# Patient Record
Sex: MALE | Race: WHITE
[De-identification: names, ages, dates, MRNs, and addresses within clinical notes are randomized per-mention and may not be internally consistent; named-entity substitution may affect disease eponyms.]

---

## 2018-01-10 ENCOUNTER — HOSPITAL ENCOUNTER (OUTPATIENT)
Dept: HOSPITAL 62 - RAD | Age: 68
End: 2018-01-10
Attending: SURGERY
Payer: MEDICARE

## 2018-01-10 DIAGNOSIS — D12.0: Primary | ICD-10-CM

## 2018-01-10 PROCEDURE — 82565 ASSAY OF CREATININE: CPT

## 2018-01-10 PROCEDURE — 74177 CT ABD & PELVIS W/CONTRAST: CPT

## 2018-01-10 NOTE — RADIOLOGY REPORT (SQ)
EXAM DESCRIPTION:  CT ABD/PELVIS WITH IV   ORAL



COMPLETED DATE/TIME:  1/10/2018 8:18 am



REASON FOR STUDY:  BENIGN NEOPLASM OF CECUM (D12.0) D12.0  BENIGN NEOPLASM OF CECUM



COMPARISON:  None.



TECHNIQUE:  CT scan of the abdomen and pelvis performed using helical scanning technique with dynamic
 intravenous contrast injection.  Patient drank oral contrast. Images reviewed with lung, soft tissue
, and bone windows. Reconstructed coronal and sagittal MPR images reviewed. Delayed images for evalua
tion of the urinary system also acquired. All images stored on PACS.

All CT scanners at this facility use dose modulation, iterative reconstruction, and/or weight based d
osing when appropriate to reduce radiation dose to as low as reasonably achievable (ALARA).

[facility]: Dose Right  [facility]: CareDose    [facility]: Dose Right    [facility]: Teradose 4D    [facility]: Smart Technologies



CONTRAST TYPE AND DOSE:  contrast/concentration: Isovue 370.00 mg/ml; Total Contrast Delivered: 93.0 
ml; Total Saline Delivered: 71.0 ml



RENAL FUNCTION:  Creatinine 0.9



RADIATION DOSE:  CT Rad equipment meets quality standard of care and radiation dose reduction techniq
ues were employed. CTDIvol: 10.9 - 12.6 mGy. DLP: 1278 mGy-cm..



LIMITATIONS:  None.



FINDINGS:  Patient had a recent colonoscopy which demonstrated a mass in the cecum, tubulovillous at 
adenoma with atypia.  On axial images 56 through 66, and coronal image 55, there is a 4.8 cm cranioca
udad by 5.2 cm transverse x 2.6 cm AP mass along the posterior wall of the cecum extending from the i
leocecal valve to the base of the appendix.  There are several less than 7 mm right colon mesenteric 
nodes.

Remainder of the gastrointestinal tract is otherwise unremarkable.  Patient drank oral contrast.  No 
evidence of bowel obstruction.  No free air or fluid.

LOWER CHEST: Less than 4 mm left lateral costophrenic sulcus noncalcified granuloma axial image 14

LIVER: Normal size.  No masses.  No dilated ducts.  9 mm cyst sub- diaphragmatic surface left lobe li
davie axial image 15

SPLEEN: Normal size. No focal lesions.

PANCREAS: No masses. No significant calcifications. No adjacent inflammation or peripancreatic fluid 
collections. Pancreatic duct not dilated.

GALLBLADDER: No identified stones by CT criteria. No inflammatory changes to suggest cholecystitis.

ADRENAL GLANDS: No significant masses or asymmetry.

RIGHT KIDNEY AND URETER: No solid masses.   No significant calcifications.   No hydronephrosis or hyd
roureter.

LEFT KIDNEY AND URETER: No solid masses.  11 cm left renal cortical cyst.  No significant calcificati
ons.   No hydronephrosis or hydroureter.

AORTA AND VESSELS: No aneurysm. No dissection. Renal arteries, SMA, celiac without stenosis.

RETROPERITONEUM: No retroperitoneal adenopathy, hemorrhage or masses.

BOWEL AND PERITONEAL CAVITY: As above

APPENDIX: Normal.

PELVIS: No mass.  No free fluid. Normal bladder.

ABDOMINAL WALL: No masses. No hernias.

BONES: No significant or acute findings.

OTHER: No other significant finding.



IMPRESSION:  5.2 x 4.8 x 2.6 cm mass in the cecum, with multiple small right colon mesenteric nodes



TECHNICAL DOCUMENTATION:  JOB ID:  [id number]

Quality ID # [id number]: Final reports with documentation of one or more dose reduction techniques (e.g., Au
tomated exposure control, adjustment of the mA and/or kV according to patient size, use of iterative 
reconstruction technique)

 2011 [employer]- All Rights Reserved

## 2018-02-15 LAB
ALBUMIN SERPL-MCNC: 4.4 G/DL (ref 3.5–5)
ALP SERPL-CCNC: 62 U/L (ref 38–126)
ALT SERPL-CCNC: 31 U/L (ref 21–72)
ANION GAP SERPL CALC-SCNC: 10 MMOL/L (ref 5–19)
AST SERPL-CCNC: 22 U/L (ref 17–59)
BILIRUB DIRECT SERPL-MCNC: 0.4 MG/DL (ref 0–0.4)
BILIRUB SERPL-MCNC: 0.5 MG/DL (ref 0.2–1.3)
BUN SERPL-MCNC: 23 MG/DL (ref 7–20)
CALCIUM: 10 MG/DL (ref 8.4–10.2)
CEA SERPL-MCNC: 0.9 NG/ML (ref ?–3)
CHLORIDE SERPL-SCNC: 102 MMOL/L (ref 98–107)
CO2 SERPL-SCNC: 29 MMOL/L (ref 22–30)
ERYTHROCYTE [DISTWIDTH] IN BLOOD BY AUTOMATED COUNT: 13.1 % (ref 11.5–14)
GLUCOSE SERPL-MCNC: 90 MG/DL (ref 75–110)
HCT VFR BLD CALC: 44.4 % (ref 37.9–51)
HGB BLD-MCNC: 15 G/DL (ref 13.5–17)
MCH RBC QN AUTO: 29.4 PG (ref 27–33.4)
MCHC RBC AUTO-ENTMCNC: 33.7 G/DL (ref 32–36)
MCV RBC AUTO: 87 FL (ref 80–97)
PLATELET # BLD: 249 10^3/UL (ref 150–450)
POTASSIUM SERPL-SCNC: 4.9 MMOL/L (ref 3.6–5)
PROT SERPL-MCNC: 7 G/DL (ref 6.3–8.2)
RBC # BLD AUTO: 5.09 10^6/UL (ref 4.35–5.55)
SODIUM SERPL-SCNC: 141.4 MMOL/L (ref 137–145)
WBC # BLD AUTO: 6.4 10^3/UL (ref 4–10.5)

## 2018-02-15 NOTE — RADIOLOGY REPORT (SQ)
EXAM DESCRIPTION:  CHEST PA/LATERAL



COMPLETED DATE/TIME:  2/15/2018 11:27 am



REASON FOR STUDY:  PRE-OP



COMPARISON:  None.



EXAM PARAMETERS:  NUMBER OF VIEWS: two views

TECHNIQUE: Digital Frontal and Lateral radiographic views of the chest acquired.

RADIATION DOSE: NA

LIMITATIONS: none



FINDINGS:  LUNGS AND PLEURA: No opacities, masses or pneumothorax. No pleural effusion.

MEDIASTINUM AND HILAR STRUCTURES: No masses or contour abnormalities.

HEART AND VASCULAR STRUCTURES: Heart normal size.  No evidence for failure.

BONES: No acute findings.

HARDWARE: None in the chest.

OTHER: No other significant finding.



IMPRESSION:  NO SIGNIFICANT RADIOGRAPHIC FINDING IN THE CHEST.



TECHNICAL DOCUMENTATION:  JOB ID:  [id number]

 2011 [employer]- All Rights Reserved

## 2018-02-16 NOTE — EKG REPORT
SEVERITY:- OTHERWISE NORMAL ECG -

SINUS BRADYCARDIA

:

Confirmed by: Micki Judge 16-Feb-2018 11:05:43

## 2018-02-20 ENCOUNTER — HOSPITAL ENCOUNTER (INPATIENT)
Dept: HOSPITAL 62 - INOR | Age: 68
LOS: 13 days | Discharge: HOME | DRG: 331 | End: 2018-03-05
Attending: SURGERY | Admitting: SURGERY
Payer: MEDICARE

## 2018-02-20 DIAGNOSIS — H40.9: ICD-10-CM

## 2018-02-20 DIAGNOSIS — K63.5: ICD-10-CM

## 2018-02-20 DIAGNOSIS — C18.0: Primary | ICD-10-CM

## 2018-02-20 PROCEDURE — 88309 TISSUE EXAM BY PATHOLOGIST: CPT

## 2018-02-20 PROCEDURE — 36415 COLL VENOUS BLD VENIPUNCTURE: CPT

## 2018-02-20 PROCEDURE — 80048 BASIC METABOLIC PNL TOTAL CA: CPT

## 2018-02-20 PROCEDURE — 71046 X-RAY EXAM CHEST 2 VIEWS: CPT

## 2018-02-20 PROCEDURE — 85027 COMPLETE CBC AUTOMATED: CPT

## 2018-02-20 PROCEDURE — 93010 ELECTROCARDIOGRAM REPORT: CPT

## 2018-02-20 PROCEDURE — 80053 COMPREHEN METABOLIC PANEL: CPT

## 2018-02-20 PROCEDURE — 82378 CARCINOEMBRYONIC ANTIGEN: CPT

## 2018-02-20 PROCEDURE — 93005 ELECTROCARDIOGRAM TRACING: CPT

## 2018-02-20 PROCEDURE — S0028 INJECTION, FAMOTIDINE, 20 MG: HCPCS

## 2018-02-27 PROCEDURE — 0DTF0ZZ RESECTION OF RIGHT LARGE INTESTINE, OPEN APPROACH: ICD-10-PCS | Performed by: SURGERY

## 2018-02-27 RX ADMIN — HYDROMORPHONE HYDROCHLORIDE PRN MG: 2 INJECTION INTRAMUSCULAR; INTRAVENOUS; SUBCUTANEOUS at 16:45

## 2018-02-27 RX ADMIN — Medication SCH ML: at 21:20

## 2018-02-27 RX ADMIN — HYDROMORPHONE HYDROCHLORIDE PRN MG: 2 INJECTION INTRAMUSCULAR; INTRAVENOUS; SUBCUTANEOUS at 21:20

## 2018-02-27 RX ADMIN — KETOROLAC TROMETHAMINE PRN MG: 30 INJECTION, SOLUTION INTRAMUSCULAR at 19:31

## 2018-02-27 NOTE — OPERATIVE REPORT
Operative Report


DATE OF SURGERY: 02/27/18


PREOPERATIVE DIAGNOSIS: Right colon mass.


POSTOPERATIVE DIAGNOSIS: Right colon mass.


OPERATION: Right hemicolectomy.


SURGEON: REAGAN RENDON


ANESTHESIA: GA


TISSUE REMOVED OR ALTERED: Right colon.


COMPLICATIONS: 





None.


ESTIMATED BLOOD LOSS: 200 cc


INTRAOPERATIVE FINDINGS: Large cecal mass.  Mesh at the periumbilical fascia 

well incorporated.  1 cm left lateral segment of the liver smooth round mass 

consistent with cyst seen on CT scan.


PROCEDURE: 





Informed consent was obtained.  Patient was brought to the operating room and 

placed on the operating room table in the supine position.  After satisfactory 

induction of general anesthesia patient's abdomen was prepped and draped in the 

usual sterile fashion.  A midline abdominal incision was made dissection was 

carried down through the fascia and the peritoneal cavity was entered without 

difficulty in the upper abdomen.  Dissection was then carried down where I 

encountered well incorporated mesh at the periumbilical region.  The mesh was 

sharply incised.  There were omental adhesions to the mesh which were taken 

down.  Exploratory laparotomy was performed first.  The small bowel was run 

from the ligament of Treitz to the ileocecal junction.  The small bowel was 

normal.  There was a large mass palpable in the cecum.  Remainder the colon 

felt normal with no palpable masses.  The peritoneal surface was smooth other 

than the region of the mesh.  The liver felt smooth. there was a palpable 1 cm 

lump that was smooth round but could not be visualized from the surface, at the 

left lateral segment of the liver.  This lump corresponded with the cyst that 

was seen by CT scan.  I discussed the CT scan finding with the radiologist who 

was certain that the lesion was not a solid lesion.  With a benign feel of the 

lump as well as the benign appearance on CT scan I did not biopsy this lesion.  

The stomach surface felt smooth.  NG tube position was confirmed.  Markedly 

fatty omentum made the procedure difficult.  The right colon was mobilized 

along the line of Toldt.  The hepatic flexure was completely mobilized.  The 

ureter and the duodenum were visualized and protected during the dissection.  

The ileocolic artery was taken very close to its origin by clamping dividing 

and tying.  The entire right colon mesentery was taken with the specimen as 

well as the right half of the omentum.  The terminal ileum was divided with a 

MARIAH stapling device several centimeters away from the ileocolic junction.  The 

transverse colon was divided at the mid transverse colon level with a MARIAH 

stapling device.  The mesentery of the resected portion of the transverse colon 

was taken using a LigaSure device.  The specimen was submitted to pathology and 

pathology confirmed a suspicious appearing mass in the cecum.  No abnormalities 

at the margins.  Hemostasis appeared good.  Bowel continuity was then re-

created between the ileum and the transverse colon with a MARIAH stapling device, 

creating a side-to-side functional end-to-end anastomosis.  The enterotomy made 

to introduced a stapling device was closed with a TA stapling device.  The 

anastomosis appeared secure.  Vascular supply appeared excellent with palpable 

pulses in the mesentery of the ileum as well as the transverse colon end.  

Bowel was returned back into the peritoneal cavity.  Omentum was draped over 

the bowel.  Sponge needle and instrument counts were all correct.  The fascia 

was closed with running PDS suture.  Skin was closed with briana.  Marcaine 

was injected at the operative site.  Patient tolerated procedure well with no 

apparent complications and was taken to the recovery area in stable condition.

## 2018-02-27 NOTE — PDOC PROGRESS REPORT
Subjective


Progress Note for:: 02/27/18


Subjective:: 


Feels well.  No complaints.





Reason For Visit: 


D12.0 BENIGN NEOPLASM OF CECUM








Physical Exam


Vital Signs: 


 











Temp Pulse Resp BP Pulse Ox


 


 97.6 F   53 L  18   137/91 H  97 


 


 02/27/18 16:03  02/27/18 16:03  02/27/18 16:03  02/27/18 15:25  02/27/18 16:03








 Intake & Output











 02/26/18 02/27/18 02/28/18





 06:59 06:59 06:59


 


Intake Total   3000


 


Output Total   600


 


Balance   2400


 


Weight   86.18 kg











General appearance: PRESENT: no acute distress, cooperative


Respiratory exam: PRESENT: clear to auscultation debra


Cardiovascular exam: PRESENT: RRR


GI/Abdominal exam: PRESENT: other - Soft, appropriate tenderness


Extremities exam: PRESENT: other - No swelling and no tenderness





Results


Laboratory Results: 


 





 02/15/18 11:00 





 02/15/18 11:00 








Impressions: 


 





Chest X-Ray  02/15/18 11:17


IMPRESSION:  NO SIGNIFICANT RADIOGRAPHIC FINDING IN THE CHEST.


 














Assessment & Plan





- Diagnosis


(1) Colon neoplasm


Is this a current diagnosis for this admission?: Yes   


Plan: 


Status post right hemicolectomy.  Patient looks good postoperatively.  Await 

bowel function.

## 2018-02-28 LAB
ANION GAP SERPL CALC-SCNC: 5 MMOL/L (ref 5–19)
BUN SERPL-MCNC: 23 MG/DL (ref 7–20)
CALCIUM: 8.5 MG/DL (ref 8.4–10.2)
CHLORIDE SERPL-SCNC: 104 MMOL/L (ref 98–107)
CO2 SERPL-SCNC: 30 MMOL/L (ref 22–30)
ERYTHROCYTE [DISTWIDTH] IN BLOOD BY AUTOMATED COUNT: 13.3 % (ref 11.5–14)
GLUCOSE SERPL-MCNC: 120 MG/DL (ref 75–110)
HCT VFR BLD CALC: 39.9 % (ref 37.9–51)
HGB BLD-MCNC: 13.4 G/DL (ref 13.5–17)
MCH RBC QN AUTO: 29.7 PG (ref 27–33.4)
MCHC RBC AUTO-ENTMCNC: 33.7 G/DL (ref 32–36)
MCV RBC AUTO: 88 FL (ref 80–97)
PLATELET # BLD: 270 10^3/UL (ref 150–450)
POTASSIUM SERPL-SCNC: 5.1 MMOL/L (ref 3.6–5)
RBC # BLD AUTO: 4.53 10^6/UL (ref 4.35–5.55)
SODIUM SERPL-SCNC: 138.6 MMOL/L (ref 137–145)
WBC # BLD AUTO: 14 10^3/UL (ref 4–10.5)

## 2018-02-28 RX ADMIN — HYDROMORPHONE HYDROCHLORIDE PRN MG: 2 INJECTION INTRAMUSCULAR; INTRAVENOUS; SUBCUTANEOUS at 09:23

## 2018-02-28 RX ADMIN — KETOROLAC TROMETHAMINE PRN MG: 30 INJECTION, SOLUTION INTRAMUSCULAR at 20:15

## 2018-02-28 RX ADMIN — Medication SCH ML: at 14:42

## 2018-02-28 RX ADMIN — HYDROMORPHONE HYDROCHLORIDE PRN MG: 2 INJECTION INTRAMUSCULAR; INTRAVENOUS; SUBCUTANEOUS at 05:52

## 2018-02-28 RX ADMIN — Medication SCH ML: at 05:05

## 2018-02-28 RX ADMIN — HYDROMORPHONE HYDROCHLORIDE PRN MG: 2 INJECTION INTRAMUSCULAR; INTRAVENOUS; SUBCUTANEOUS at 23:54

## 2018-02-28 RX ADMIN — HYDROMORPHONE HYDROCHLORIDE PRN MG: 2 INJECTION INTRAMUSCULAR; INTRAVENOUS; SUBCUTANEOUS at 01:09

## 2018-02-28 RX ADMIN — SODIUM CHLORIDE PRN ML: 9 INJECTION, SOLUTION INTRAVENOUS at 11:29

## 2018-02-28 RX ADMIN — Medication SCH ML: at 21:27

## 2018-02-28 RX ADMIN — HYDROMORPHONE HYDROCHLORIDE PRN MG: 2 INJECTION INTRAMUSCULAR; INTRAVENOUS; SUBCUTANEOUS at 13:35

## 2018-02-28 RX ADMIN — HYDROMORPHONE HYDROCHLORIDE PRN MG: 2 INJECTION INTRAMUSCULAR; INTRAVENOUS; SUBCUTANEOUS at 18:24

## 2018-02-28 NOTE — PDOC PROGRESS REPORT
Subjective


Progress Note for:: 02/28/18


Subjective:: 





Feels well.  No complaints


Reason For Visit: 


D12.0 BENIGN NEOPLASM OF CECUM








Physical Exam


Vital Signs: 


 











Temp Pulse Resp BP Pulse Ox


 


 97.7 F   61   16   132/77 H  97 


 


 02/28/18 08:00  02/28/18 08:00  02/28/18 08:00  02/28/18 08:00  02/28/18 08:00








 Intake & Output











 02/27/18 02/28/18 03/01/18





 06:59 06:59 06:59


 


Intake Total  4364 


 


Output Total  1050 


 


Balance  3314 


 


Weight  97.8 kg 











General appearance: PRESENT: no acute distress, cooperative


Respiratory exam: PRESENT: clear to auscultation debra


Cardiovascular exam: PRESENT: RRR


GI/Abdominal exam: PRESENT: other - Soft, moderately distended.  NG output scant


Extremities exam: PRESENT: other - No swelling and no tenderness





Results


Laboratory Results: 


 





 02/28/18 06:58 





 02/28/18 06:58 





 











  02/28/18 02/28/18





  06:58 06:58


 


WBC  14.0 H 


 


RBC  4.53 


 


Hgb  13.4 L 


 


Hct  39.9 


 


MCV  88 


 


MCH  29.7 


 


MCHC  33.7 


 


RDW  13.3 


 


Plt Count  270 


 


Sodium   138.6


 


Potassium   5.1 H


 


Chloride   104


 


Carbon Dioxide   30


 


Anion Gap   5


 


BUN   23 H


 


Creatinine   0.99


 


Est GFR ( Amer)   > 60


 


Est GFR (Non-Af Amer)   > 60


 


Glucose   120 H


 


Calcium   8.5











Impressions: 


 





Chest X-Ray  02/15/18 11:17


IMPRESSION:  NO SIGNIFICANT RADIOGRAPHIC FINDING IN THE CHEST.


 














Assessment & Plan





- Diagnosis


(1) Colon neoplasm


Is this a current diagnosis for this admission?: Yes   


Plan: 


Status post right hemicolectomy.  Looks a little dry. will give IV fluid bolus.

  Keep Johnson until better ambulation.  Await bowel function.

## 2018-03-01 RX ADMIN — FAMOTIDINE SCH MG: 10 INJECTION INTRAVENOUS at 17:36

## 2018-03-01 RX ADMIN — SODIUM CHLORIDE PRN ML: 9 INJECTION, SOLUTION INTRAVENOUS at 09:12

## 2018-03-01 RX ADMIN — HYDROMORPHONE HYDROCHLORIDE PRN MG: 2 INJECTION INTRAMUSCULAR; INTRAVENOUS; SUBCUTANEOUS at 05:12

## 2018-03-01 RX ADMIN — SODIUM CHLORIDE PRN ML: 9 INJECTION, SOLUTION INTRAVENOUS at 19:16

## 2018-03-01 RX ADMIN — Medication SCH ML: at 05:05

## 2018-03-01 RX ADMIN — Medication SCH ML: at 21:55

## 2018-03-01 RX ADMIN — Medication SCH ML: at 13:05

## 2018-03-01 RX ADMIN — KETOROLAC TROMETHAMINE PRN MG: 30 INJECTION, SOLUTION INTRAMUSCULAR at 09:12

## 2018-03-01 RX ADMIN — HYDROMORPHONE HYDROCHLORIDE PRN MG: 2 INJECTION INTRAMUSCULAR; INTRAVENOUS; SUBCUTANEOUS at 13:05

## 2018-03-01 RX ADMIN — KETOROLAC TROMETHAMINE PRN MG: 30 INJECTION, SOLUTION INTRAMUSCULAR at 20:20

## 2018-03-01 NOTE — PDOC PROGRESS REPORT
Subjective


Progress Note for:: 03/01/18


Subjective:: 





Feels well.  No flatus.  No nausea or vomiting.  Pain under reasonable control.

  Ambulating.


Reason For Visit: 


D12.0 BENIGN NEOPLASM OF CECUM








Physical Exam


Vital Signs: 


 











Temp Pulse Resp BP Pulse Ox


 


 98.1 F   67   18   156/75 H  94 


 


 03/01/18 08:00  03/01/18 08:00  03/01/18 08:00  03/01/18 08:00  03/01/18 08:00








 Intake & Output











 02/28/18 03/01/18 03/02/18





 06:59 06:59 06:59


 


Intake Total 4364 3100 


 


Output Total 1050 1690 


 


Balance 3314 1410 


 


Weight 97.8 kg 97.8 kg 











General appearance: PRESENT: no acute distress, cooperative


Respiratory exam: PRESENT: clear to auscultation debra


Cardiovascular exam: PRESENT: RRR


GI/Abdominal exam: PRESENT: other - Soft, moderately distended, diminished 

bowel sounds, appropriate mild tenderness.


Extremities exam: PRESENT: other - No swelling and no tenderness





Results


Laboratory Results: 


 





 02/28/18 06:58 





 02/28/18 06:58 








Impressions: 


 





Chest X-Ray  02/15/18 11:17


IMPRESSION:  NO SIGNIFICANT RADIOGRAPHIC FINDING IN THE CHEST.


 














Assessment & Plan





- Diagnosis


(1) Colon neoplasm


Is this a current diagnosis for this admission?: Yes   


Plan: 


Status post right hemicolectomy.  Looks good.  SURESH Johnson.  Continue to encourage 

ambulation.  Await bowel function.

## 2018-03-02 LAB
ANION GAP SERPL CALC-SCNC: 11 MMOL/L (ref 5–19)
BUN SERPL-MCNC: 13 MG/DL (ref 7–20)
CALCIUM: 9 MG/DL (ref 8.4–10.2)
CHLORIDE SERPL-SCNC: 101 MMOL/L (ref 98–107)
CO2 SERPL-SCNC: 27 MMOL/L (ref 22–30)
GLUCOSE SERPL-MCNC: 73 MG/DL (ref 75–110)
POTASSIUM SERPL-SCNC: 4.6 MMOL/L (ref 3.6–5)
SODIUM SERPL-SCNC: 139.1 MMOL/L (ref 137–145)

## 2018-03-02 RX ADMIN — FAMOTIDINE SCH MG: 10 INJECTION INTRAVENOUS at 06:04

## 2018-03-02 RX ADMIN — Medication SCH ML: at 23:21

## 2018-03-02 RX ADMIN — FAMOTIDINE SCH MG: 10 INJECTION INTRAVENOUS at 18:15

## 2018-03-02 RX ADMIN — HYDROMORPHONE HYDROCHLORIDE PRN MG: 2 INJECTION INTRAMUSCULAR; INTRAVENOUS; SUBCUTANEOUS at 02:41

## 2018-03-02 RX ADMIN — Medication SCH ML: at 06:04

## 2018-03-02 RX ADMIN — Medication SCH ML: at 16:14

## 2018-03-02 RX ADMIN — KETOROLAC TROMETHAMINE PRN MG: 30 INJECTION, SOLUTION INTRAMUSCULAR at 23:47

## 2018-03-02 NOTE — PDOC PROGRESS REPORT
Subjective


Subjective:: 





Feels well.  No flatus.  Ambulating very well.


Reason For Visit: 


D12.0 BENIGN NEOPLASM OF CECUM








Physical Exam


Vital Signs: 


 











Temp Pulse Resp BP Pulse Ox


 


 98.2 F   62   20   160/86 H  91 L


 


 03/02/18 04:03  03/02/18 04:03  03/02/18 04:03  03/02/18 04:03  03/02/18 04:03








 Intake & Output











 03/01/18 03/02/18 03/03/18





 06:59 06:59 06:59


 


Intake Total 3100 2054 


 


Output Total 1690 3520 


 


Balance 1410 -1466 


 


Weight 97.8 kg 91.6 kg 











General appearance: PRESENT: no acute distress, cooperative


Respiratory exam: PRESENT: clear to auscultation debra


Cardiovascular exam: PRESENT: RRR


GI/Abdominal exam: PRESENT: other - Soft, nondistended, minimal tenderness.  

Wound clean dry and intact.


Extremities exam: PRESENT: other - No swelling and no tenderness





Results


Laboratory Results: 


 





 02/28/18 06:58 





 02/28/18 06:58 








Impressions: 


 





Chest X-Ray  02/15/18 11:17


IMPRESSION:  NO SIGNIFICANT RADIOGRAPHIC FINDING IN THE CHEST.


 














Assessment & Plan





- Diagnosis


(1) Colon neoplasm


Is this a current diagnosis for this admission?: Yes   





(2) Cecal cancer


Is this a current diagnosis for this admission?: Yes   


Plan: 


T3N0. Doing well after right hemicolectomy.  Await bowel function.

## 2018-03-03 RX ADMIN — Medication SCH ML: at 21:54

## 2018-03-03 RX ADMIN — Medication SCH ML: at 05:30

## 2018-03-03 RX ADMIN — FAMOTIDINE SCH MG: 10 INJECTION INTRAVENOUS at 05:30

## 2018-03-03 RX ADMIN — FAMOTIDINE SCH MG: 10 INJECTION INTRAVENOUS at 18:12

## 2018-03-03 RX ADMIN — Medication SCH ML: at 15:49

## 2018-03-03 NOTE — PDOC PROGRESS REPORT
Subjective


Progress Note for:: 03/03/18


Subjective:: 





Passed flatus and had BM


Reason For Visit: 


D12.0 BENIGN NEOPLASM OF CECUM








Physical Exam


Vital Signs: 


 











Temp Pulse Resp BP Pulse Ox


 


 98.3 F   58 L  16   154/92 H  96 


 


 03/03/18 15:32  03/03/18 15:32  03/03/18 15:32  03/03/18 15:32  03/03/18 15:32








 Intake & Output











 03/02/18 03/03/18 03/04/18





 06:59 06:59 06:59


 


Intake Total 2054 826 252


 


Output Total 3520 1250 400


 


Balance -1466 -424 -148


 


Weight 91.6 kg 89.9 kg 











Exam: 





Abd is soft with minimal tenderness





Results


Laboratory Results: 


 





 02/28/18 06:58 





 03/02/18 09:24 








Impressions: 


 





Chest X-Ray  02/15/18 11:17


IMPRESSION:  NO SIGNIFICANT RADIOGRAPHIC FINDING IN THE CHEST.


 














Assessment & Plan





- Time


Time Spent with patient: 15-24 minutes





- Plan Summary


Plan Summary: 





Start clear liquids

## 2018-03-04 RX ADMIN — Medication SCH ML: at 21:27

## 2018-03-04 RX ADMIN — FAMOTIDINE SCH MG: 10 INJECTION INTRAVENOUS at 18:23

## 2018-03-04 RX ADMIN — Medication SCH ML: at 14:52

## 2018-03-04 RX ADMIN — HYDROCORTISONE SCH APPLIC: 1 CREAM TOPICAL at 21:27

## 2018-03-04 RX ADMIN — Medication SCH ML: at 05:31

## 2018-03-04 RX ADMIN — FAMOTIDINE SCH MG: 10 INJECTION INTRAVENOUS at 05:31

## 2018-03-04 NOTE — PDOC PROGRESS REPORT
Subjective


Progress Note for:: 03/04/18


Subjective:: 





Had BM today


Reason For Visit: 


D12.0 BENIGN NEOPLASM OF CECUM








Physical Exam


Vital Signs: 


 











Temp Pulse Resp BP Pulse Ox


 


 97.3 F   74   20   168/100 H  100 


 


 03/04/18 15:24  03/04/18 15:24  03/04/18 15:24  03/04/18 15:24  03/04/18 15:24








 Intake & Output











 03/03/18 03/04/18 03/05/18





 06:59 06:59 06:59


 


Intake Total 826 427 330


 


Output Total 1250 400 


 


Balance -424 27 330


 


Weight 89.9 kg  











Exam: 





Abd is soft just slightly distended





Results


Laboratory Results: 


 





 02/28/18 06:58 





 03/02/18 09:24 








Impressions: 


 





Chest X-Ray  02/15/18 11:17


IMPRESSION:  NO SIGNIFICANT RADIOGRAPHIC FINDING IN THE CHEST.


 














Assessment & Plan





- Time


Time Spent with patient: 15-24 minutes





- Plan Summary


Plan Summary: 





Start clears and progress as tolerated


Possible discharge in 24-48 hrs

## 2018-03-05 VITALS — DIASTOLIC BLOOD PRESSURE: 88 MMHG | SYSTOLIC BLOOD PRESSURE: 131 MMHG

## 2018-03-05 RX ADMIN — Medication SCH ML: at 05:35

## 2018-03-05 RX ADMIN — FAMOTIDINE SCH MG: 10 INJECTION INTRAVENOUS at 05:35

## 2018-03-05 RX ADMIN — HYDROCORTISONE SCH APPLIC: 1 CREAM TOPICAL at 05:35

## 2018-03-05 NOTE — PDOC PROGRESS REPORT
Subjective


Progress Note for:: 03/05/18


Subjective:: 





Feels well.  Having bowel movements.  Tolerating diet well.  Pain minimal.


Reason For Visit: 


D12.0 BENIGN NEOPLASM OF CECUM








Physical Exam


Vital Signs: 


 











Temp Pulse Resp BP Pulse Ox


 


 98.6 F   69   16   131/88 H  96 


 


 03/05/18 08:00  03/05/18 08:00  03/05/18 08:00  03/05/18 08:00  03/05/18 08:00








 Intake & Output











 03/04/18 03/05/18 03/06/18





 06:59 06:59 06:59


 


Intake Total 427 790 


 


Output Total 400  


 


Balance 27 790 


 


Weight  83.5 kg 











General appearance: PRESENT: no acute distress, cooperative


Respiratory exam: PRESENT: clear to auscultation debra


Cardiovascular exam: PRESENT: RRR


GI/Abdominal exam: PRESENT: other - Soft, Nondistended, nontender to palpation.

  Wound clean dry and intact with staples.


Extremities exam: PRESENT: other - No swelling and no tenderness





Results


Laboratory Results: 


 





 02/28/18 06:58 





 03/02/18 09:24 








Impressions: 


 





Chest X-Ray  02/15/18 11:17


IMPRESSION:  NO SIGNIFICANT RADIOGRAPHIC FINDING IN THE CHEST.


 














Assessment & Plan





- Diagnosis


(1) Colon neoplasm


Is this a current diagnosis for this admission?: Yes   





(2) Cecal cancer


Is this a current diagnosis for this admission?: Yes   


Plan: 


T3N0. Doing well after right hemicolectomy.  Will discharge patient home.  

Follow-up this Wednesday for staple removal.

## 2018-03-05 NOTE — DISCHARGE SUMMARY E
Discharge Summary



NAME: JOSTIN CORADO

MRN:  [id number]        : 1950     AGE: 67Y

ADMITTED: 2018                  DISCHARGED: 2018



DISCHARGE DIAGNOSIS:

Cecal cancer.



PROCEDURE PERFORMED DURING HOSPITALIZATION:

Right hemicolectomy performed by Dr. Rosales Avila on 2018.



HOSPITAL COURSE:

The patient underwent the abovementioned procedure.  He had did well

postoperatively.  He had gradual resumption of bowel function.  He was

tolerating a diet well with good bowel function at the time of discharge. 

He was virtually pain free.  The patient has now been discharged to home

in good condition.  He will follow up with me this Wednesday.  He is

encouraged to stay active at home but avoid strenuous activity.  He may

resume his home medications.  He may take Tylenol p.r.n. for pain.









DICTATING PHYSICIAN:  ROSALES AVILA M.D.





[id number]                  DT: 2018    [id number]

PHY#: [id number]            DD: [id number]

ID:   [id number]           JOB#: [id number]       ACCT: [id number]



cc:ROSALES AVILA M.D.

>

## 2018-04-27 ENCOUNTER — HOSPITAL ENCOUNTER (OUTPATIENT)
Dept: HOSPITAL 62 - OD | Age: 68
End: 2018-04-27
Attending: SURGERY
Payer: MEDICARE

## 2018-04-27 DIAGNOSIS — E78.5: ICD-10-CM

## 2018-04-27 DIAGNOSIS — C18.2: Primary | ICD-10-CM

## 2018-04-27 DIAGNOSIS — R97.20: ICD-10-CM

## 2018-04-27 LAB
CHOLEST SERPL-MCNC: 233.71 MG/DL (ref 0–200)
LDLC SERPL DIRECT ASSAY-MCNC: 134 MG/DL (ref ?–100)
TRIGL SERPL-MCNC: 130 MG/DL (ref ?–150)
VLDLC SERPL CALC-MCNC: 26 MG/DL (ref 10–31)

## 2018-04-27 PROCEDURE — 80061 LIPID PANEL: CPT

## 2018-04-27 PROCEDURE — 82378 CARCINOEMBRYONIC ANTIGEN: CPT

## 2018-04-27 PROCEDURE — 36415 COLL VENOUS BLD VENIPUNCTURE: CPT

## 2018-04-27 PROCEDURE — 84153 ASSAY OF PSA TOTAL: CPT

## 2018-09-02 ENCOUNTER — HOSPITAL ENCOUNTER (EMERGENCY)
Dept: HOSPITAL 62 - ER | Age: 68
Discharge: HOME | End: 2018-09-02
Payer: MEDICARE

## 2018-09-02 VITALS — SYSTOLIC BLOOD PRESSURE: 144 MMHG | DIASTOLIC BLOOD PRESSURE: 82 MMHG

## 2018-09-02 DIAGNOSIS — T63.441A: Primary | ICD-10-CM

## 2018-09-02 DIAGNOSIS — L03.114: ICD-10-CM

## 2018-09-02 PROCEDURE — 99282 EMERGENCY DEPT VISIT SF MDM: CPT

## 2018-09-02 NOTE — ER DOCUMENT REPORT
ED General





- General


Chief Complaint: Bee Sting


Stated Complaint: POSSIBLE BUG BITE


Time Seen by Provider: 09/02/18 22:21


Notes: 





Patient is a 68-year-old male who presents with approximately 12-24 hours of 

progressively worsening redness and pain to his left forearm and wrist.  The 

patient states that approximately 24 hours ago he was stung by yellow jacket on 

his left wrist.  He states that he pulled the stinger out and did not have any 

further symptoms thereafter.  He states that initially when he woke up this 

morning he felt fine, but as the day progressed he noticed progressively 

worsening redness spreading from his wrist up his forearm and now past his 

elbow.  He states that the area is quite itchy but is also painful.  He 

describes as a throbbing, aching pain.  Nothing improves or worsens his 

symptoms.  He has tried Claritin at home with minimal to no improvement.  He 

denies history of similar symptoms in the past.  He has not contacted his 

primary care doctor regarding today's concerns.  He denies fever or 

constitutional symptoms.


TRAVEL OUTSIDE OF THE [place] IN LAST 30 DAYS: No





- Related Data


Allergies/Adverse Reactions: 


 





Sulfa (Sulfonamide Antibiotics) Adverse Reaction (Verified 02/27/18 09:47)


 Anxiety











Past Medical History





- General


Information source: Patient





- Social History


Smoking Status: Never Smoker


Frequency of alcohol use: None


Drug Abuse: None


Lives with: Spouse/Significant other


Family History: Reviewed & Not Pertinent


Patient has suicidal ideation: No


Patient has homicidal ideation: No





- Past Medical History


Cardiac Medical History: 


   Denies: Hx Atrial Fibrillation, Hx Congestive Heart Failure, Hx Coronary 

Artery Disease, Hx Heart Attack, Hx Hypercholesterolemia, Hx Hypertension, Hx 

Peripheral Vascular Disease, Hx Pulmonary Embolism, Hx Heart Murmur


Pulmonary Medical History: 


   Denies: Hx Asthma, Hx Bronchitis, Hx COPD, Hx Pneumonia, Hx Respiratory 

Failure, Hx Sleep Apnea, Hx Tuberculosis


Renal/ Medical History: Reports: Hx Benign Prostatic Hyperplasia.  Denies: Hx 

End Stage Renal Disease, Hx Kidney Stones, Hx Peritoneal Dialysis


Malignancy Medical History: Denies Hx Lung Cancer


GI Medical History: Denies: Hx Crohn's Disease, Hx Gastroesophageal Reflux 

Disease, Hx Hiatal Hernia, Hx Irritable Bowel, Hx Liver Failure, Hx Pancreatitis

, Hx Ulcer


Musculoskeletal Medical History: Denies Hx Arthritis, Denies Hx Fibromyalgia, 

Denies Hx Muscular Dystrophy


Traumatic Medical History: Reports: Hx Fractures - left index finger crush 

injury


Past Surgical History: Denies: Hx Appendectomy, Hx Bowel Surgery, Hx 

Cholecystectomy, Hx Colostomy, Hx Coronary Artery Bypass Graft, Hx Gastric 

Bypass Surgery, Hx Herniorrhaphy, Hx Pacemaker, Hx Tonsillectomy





Review of Systems





- Review of Systems


Notes: 





Constitutional: Negative for fever.


HENT: Negative for sore throat.


Eyes: Negative for visual changes.


Cardiovascular: Negative for chest pain.


Respiratory: Negative for shortness of breath.


Gastrointestinal: Negative for abdominal pain, vomiting or diarrhea.


Genitourinary: Negative for dysuria.


Musculoskeletal: Negative for back pain.


Skin: Positive for rash.


Neurological: Negative for headaches, weakness or numbness.





10 point ROS negative except as marked above and in HPI.





Physical Exam





- Vital signs


Vitals: 


 











Temp Pulse Resp BP Pulse Ox


 


 98.0 F   68   22 H  136/89 H  97 


 


 09/02/18 21:42  09/02/18 21:42  09/02/18 21:42  09/02/18 21:42  09/02/18 21:42











Interpretation: Normal


Notes: 





PHYSICAL EXAMINATION:





GENERAL: Well-appearing, well-nourished and in no acute distress.





HEAD: Atraumatic, normocephalic.





EYES: Pupils equal round and reactive to light, extraocular movements intact, 

sclera anicteric, conjunctiva are normal.





ENT: nares patent, oropharynx clear without exudates.  Moist mucous membranes.





NECK: Normal range of motion, supple without lymphadenopathy





LUNGS: Breath sounds clear to auscultation bilaterally and equal.  No wheezes 

rales or rhonchi.





HEART: Regular rate and rhythm without murmurs





ABDOMEN: Soft, nontender, normoactive bowel sounds.  No guarding, no rebound.  

No masses appreciated.





EXTREMITIES: Normal range of motion, no pitting or edema.  No cyanosis.





NEUROLOGICAL: No focal neurological deficits. Moves all extremities 

spontaneously and on command.





PSYCH: Normal mood, normal affect.





SKIN: Warm, Dry, normal turgor, spreading erythema over the central portion of 

the entirety of the left forearm passing just above the medial epicondyle





Course





- Re-evaluation


Re-evalutation: 





09/02/18 23:28


Patient presents with symptoms most consistent with an acute cellulitis.  

Vitals within normal limits.  Patient does not meet sepsis criteria is overall 

very well in appearance. Exam and history are not consistent with DVT. Patient 

will be started on cephalexin.  The inciting event was a bee sting although the 

area of erythema did not present until 36 hours after the sting making an acute 

allergic reaction far less likely.  I have advised the patient to take over-the-

counter antihistamines in conjunction with antibiotics to cover for the 

possibility this could be allergic but the pattern on the skin and the clinical 

history is much more consistent with an acute cellulitis.  At this time will 

discharge with return precautions and follow-up recommendations.  Verbal 

discharge instructions given a the bedside and opportunity for questions given. 

Medication warnings reviewed. Patient is in agreement with this plan and has 

verbalized understanding of return precautions and the need for primary care 

follow-up in the next 24-72 hours.





- Vital Signs


Vital signs: 


 











Temp Pulse Resp BP Pulse Ox


 


 98.8 F   55 L  16   144/82 H  95 


 


 09/02/18 23:47  09/02/18 23:47  09/02/18 23:47  09/02/18 23:47  09/02/18 23:47














Discharge





- Discharge


Clinical Impression: 


 Left arm cellulitis





Bee sting


Qualifiers:


 Encounter type: initial encounter Injury intent: accidental or unintentional 

Qualified Code(s): T63.441A - Toxic effect of venom of bees, accidental (

unintentional), initial encounter





Condition: Good


Disposition: HOME, SELF-CARE


Additional Instructions: 


The rash is likely due to infection of your skin.  You need to take the 

antibiotics as prescribed.  Do not stop even if the rash goes away until you 

have completed all the antibiotics.  The area of redness was traced out here in 

the emergency department with a marking pen.  You need to return to emergency 

department if the redness spreads outside of this area by more than 2 cm in any 

direction.  You should also return if you develop fevers with temperature 

greater than 101, persistent vomiting, worsening pain, or have any other 

symptoms that are concerning to you.


Prescriptions: 


Cephalexin Monohydrate [Keflex 500 mg Capsule] 500 mg PO Q6H 7 Days  capsule


Referrals: 


SRINI GIVENS MD [Primary Care Provider] - Follow up in 3-5 days

## 2018-11-21 ENCOUNTER — HOSPITAL ENCOUNTER (OUTPATIENT)
Dept: HOSPITAL 62 - OD | Age: 68
End: 2018-11-21
Attending: INTERNAL MEDICINE
Payer: MEDICARE

## 2018-11-21 DIAGNOSIS — N40.0: ICD-10-CM

## 2018-11-21 DIAGNOSIS — E78.5: Primary | ICD-10-CM

## 2018-11-21 DIAGNOSIS — R97.20: ICD-10-CM

## 2018-11-21 LAB
CHOLEST SERPL-MCNC: 272.77 MG/DL (ref 0–200)
LDLC SERPL DIRECT ASSAY-MCNC: 176 MG/DL (ref ?–100)
PSA SERPL-MCNC: 3.45 NG/ML (ref ?–4)
TRIGL SERPL-MCNC: 157 MG/DL (ref ?–150)
VLDLC SERPL CALC-MCNC: 31.4 MG/DL (ref 10–31)

## 2018-11-21 PROCEDURE — 84153 ASSAY OF PSA TOTAL: CPT

## 2018-11-21 PROCEDURE — 80061 LIPID PANEL: CPT

## 2018-11-21 PROCEDURE — 36415 COLL VENOUS BLD VENIPUNCTURE: CPT

## 2019-01-08 ENCOUNTER — HOSPITAL ENCOUNTER (OUTPATIENT)
Dept: HOSPITAL 62 - RAD | Age: 69
End: 2019-01-08
Attending: PHYSICIAN ASSISTANT
Payer: MEDICARE

## 2019-01-08 DIAGNOSIS — C18.2: Primary | ICD-10-CM

## 2019-01-08 DIAGNOSIS — N28.1: ICD-10-CM

## 2019-01-08 PROCEDURE — 74177 CT ABD & PELVIS W/CONTRAST: CPT

## 2019-01-08 PROCEDURE — 82565 ASSAY OF CREATININE: CPT

## 2019-01-08 PROCEDURE — 71260 CT THORAX DX C+: CPT

## 2019-01-08 NOTE — RADIOLOGY REPORT (SQ)
EXAM DESCRIPTION:  CT ABD/PELVIS WITH IV   ORAL



COMPLETED DATE/TIME:  1/8/2019 8:34 am



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



COMPARISON:  1/10/2018.



TECHNIQUE:  CT scan of the abdomen and pelvis performed using helical scanning technique with dynamic
 intravenous contrast injection.  No oral contrast. Images reviewed with lung, soft tissue, and bone 
windows. Reconstructed coronal and sagittal MPR images reviewed. Delayed images for evaluation of the
 urinary system also acquired. All images stored on PACS.

All CT scanners at this facility use dose modulation, iterative reconstruction, and/or weight based d
osing when appropriate to reduce radiation dose to as low as reasonably achievable (ALARA).

[facility]: Dose Right  [facility]: CareDose    [facility]: Dose Right    [facility]: Teradose 4D    [facility]: Smart Technologies



CONTRAST TYPE AND DOSE:  contrast/concentration: Isovue 350.00 mg/ml; Total Contrast Delivered: 100.0
 ml; Total Saline Delivered: 72.0 ml



RENAL FUNCTION:  Creatinine 1.0.



RADIATION DOSE:  CT Rad equipment meets quality standard of care and radiation dose reduction techniq
ues were employed. CTDIvol: 6.6 - 10.7 mGy. DLP: 1429 mGy-cm..



LIMITATIONS:  None.



FINDINGS:  LOWER CHEST: No significant findings. No nodules or infiltrates.

LIVER: Normal size.  Stable small subcentimeter cyst in the left lobe No solid masses.  No dilated du
cts.

SPLEEN: Normal size. No focal lesions.

PANCREAS: No masses. No significant calcifications. No adjacent inflammation or peripancreatic fluid 
collections. Pancreatic duct not dilated.

GALLBLADDER: No identified stones by CT criteria. No inflammatory changes to suggest cholecystitis.

ADRENAL GLANDS: No significant masses or asymmetry.

RIGHT KIDNEY AND URETER: No solid masses.   No significant calcifications.   No hydronephrosis or hyd
roureter.

LEFT KIDNEY AND URETER: Stable large cortical cyst.  No solid masses.   No significant calcifications
.   No hydronephrosis or hydroureter.

AORTA AND VESSELS: No aneurysm. No dissection. Renal arteries, SMA, celiac without stenosis.

RETROPERITONEUM: No retroperitoneal adenopathy, hemorrhage or masses.

BOWEL AND PERITONEAL CAVITY: Previous partial colectomy on the right.  No masses or inflammatory mcgill
ges. No free fluid or peritoneal masses.

APPENDIX: Surgically absent.

PELVIS: No mass.  No free fluid. Normal bladder.

ABDOMINAL WALL: No masses. No hernias.

BONES: No significant or acute findings.

OTHER: No other significant finding.



IMPRESSION:

1. SURGICAL CHANGES.  PREVIOUS PARTIAL COLECTOMY ON THE RIGHT.

2. STABLE SMALL SUBCENTIMETER CYST IN THE LEFT LOBE OF THE LIVER AND LARGE CORTICAL CYST IN THE LEFT 
KIDNEY.

3. NO OTHER SIGNIFICANT OR ACUTE FINDING IN THE ABDOMEN OR PELVIS ON CT SCAN WITH IV CONTRAST.  NO EV
IDENCE OF RESIDUAL OR RECURRENT TUMOR OR METASTASIS.



TECHNICAL DOCUMENTATION:  JOB ID:  [id number]

Quality ID # 436: Final reports with documentation of one or more dose reduction techniques (e.g., Au
tomated exposure control, adjustment of the mA and/or kV according to patient size, use of iterative 
reconstruction technique)

 2011 [employer]- All Rights Reserved



Reading location - IP/workstation name: [email or web address]

## 2019-01-08 NOTE — RADIOLOGY REPORT (SQ)
EXAM DESCRIPTION:  CT CHEST WITH



COMPLETED DATE/TIME:  1/8/2019 8:34 am



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



COMPARISON:  None.



TECHNIQUE:  CT scan of the chest performed using helical scanning technique with dynamic intravenous 
contrast injection.  Images reviewed with lung, soft tissue and bone windows.  Reconstructed coronal 
and sagittal MPR and MIP images reviewed.  All images stored on PACS.

All CT scanners at this facility use dose modulation, iterative reconstruction, and/or weight based d
osing when appropriate to reduce radiation dose to as low as reasonably achievable (ALARA).

[facility]: Dose Right  [facility]: CareDose    [facility]: Dose Right    [facility]: Teradose 4D    [facility]: [employer]



CONTRAST TYPE AND DOSE:  100 mL Omnipaque 350- low osmolar.



RENAL FUNCTION:  Creatinine 1.0.



RADIATION DOSE:   .



LIMITATIONS:  None.



FINDINGS:  LUNGS AND PLEURA: No opacities, nodules, masses.  No pneumothorax. No effusions.

HILAR AND MEDIASTINAL STRUCTURES: No identified masses or abnormal nodes.

HEART AND VASCULAR STRUCTURES: No aneurysm or dissection.  No central pulmonary emboli.  No pericardi
al effusion.

HARDWARE: None in the chest.

UPPER ABDOMEN: No significant findings.  Limited exam.

THYROID AND OTHER SOFT TISSUES: No masses.  No adenopathy.

BONES: No significant finding.

OTHER: No other significant finding.



IMPRESSION:  NORMAL CT OF THE CHEST WITH IV CONTRAST.



TECHNICAL DOCUMENTATION:  JOB ID:  [id number]

Quality ID # 436: Final reports with documentation of one or more dose reduction techniques (e.g., Au
tomated exposure control, adjustment of the mA and/or kV according to patient size, use of iterative 
reconstruction technique)

 2011 [employer]- All Rights Reserved



Reading location - IP/workstation name: [email or web address]